# Patient Record
Sex: MALE | Race: OTHER | HISPANIC OR LATINO
[De-identification: names, ages, dates, MRNs, and addresses within clinical notes are randomized per-mention and may not be internally consistent; named-entity substitution may affect disease eponyms.]

---
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
## 2023-11-22 NOTE — ED PEDIATRIC NURSE NOTE - CHIEF COMPLAINT QUOTE
Ambulatory accompanied by mother stating that he has diffuse abdominal pain but worse on palpation of the RLQ since approx 12a tonight. Patient had cold symptoms and was tested for COVID Tuesday with a negative result at a local [facility]. Denies N/V/D, sick contacts. Mother gave him Mylanta and Pepto Bismuth without relief PTA.
65

## 2024-01-03 ENCOUNTER — NON-APPOINTMENT (OUTPATIENT)
Age: 19
End: 2024-01-03

## 2024-01-18 ENCOUNTER — APPOINTMENT (OUTPATIENT)
Dept: PULMONOLOGY | Facility: CLINIC | Age: 19
End: 2024-01-18

## 2024-08-29 NOTE — PATIENT PROFILE PEDIATRIC. - SLEEP PATTERN, CHANGE IN, PEDS PROFILE

## 2025-01-06 NOTE — PATIENT PROFILE PEDIATRIC. - AGE
Per step father pt had recently came back from [place] on 12/16 and had been there for 2 years. Pt began developing a rash x2 days ago on the lips. Per father he was a \"little sick\" 4 days ago.    (1) 13 years and above

## 2025-02-25 ENCOUNTER — NON-APPOINTMENT (OUTPATIENT)
Age: 20
End: 2025-02-25

## 2025-04-05 ENCOUNTER — NON-APPOINTMENT (OUTPATIENT)
Age: 20
End: 2025-04-05